# Patient Record
Sex: MALE | Race: WHITE | NOT HISPANIC OR LATINO | URBAN - METROPOLITAN AREA
[De-identification: names, ages, dates, MRNs, and addresses within clinical notes are randomized per-mention and may not be internally consistent; named-entity substitution may affect disease eponyms.]

---

## 2023-04-14 LAB
ALANINE AMINOTRANSFERASE (SGPT) (U/L) IN SER/PLAS: 60 U/L (ref 10–52)
ALBUMIN (G/DL) IN SER/PLAS: 4.3 G/DL (ref 3.4–5)
ALKALINE PHOSPHATASE (U/L) IN SER/PLAS: 56 U/L (ref 33–120)
ANION GAP IN SER/PLAS: 9 MMOL/L (ref 10–20)
APPEARANCE, URINE: CLEAR
ASPARTATE AMINOTRANSFERASE (SGOT) (U/L) IN SER/PLAS: 28 U/L (ref 9–39)
BASOPHILS (10*3/UL) IN BLOOD BY AUTOMATED COUNT: 0.06 X10E9/L (ref 0–0.1)
BASOPHILS/100 LEUKOCYTES IN BLOOD BY AUTOMATED COUNT: 1.1 % (ref 0–2)
BILIRUBIN TOTAL (MG/DL) IN SER/PLAS: 0.5 MG/DL (ref 0–1.2)
BILIRUBIN, URINE: NEGATIVE
BLOOD, URINE: NEGATIVE
CALCIUM (MG/DL) IN SER/PLAS: 9.4 MG/DL (ref 8.6–10.3)
CARBON DIOXIDE, TOTAL (MMOL/L) IN SER/PLAS: 29 MMOL/L (ref 21–32)
CHLORIDE (MMOL/L) IN SER/PLAS: 107 MMOL/L (ref 98–107)
CHOLESTEROL (MG/DL) IN SER/PLAS: 210 MG/DL (ref 0–199)
CHOLESTEROL IN HDL (MG/DL) IN SER/PLAS: 41.2 MG/DL
CHOLESTEROL/HDL RATIO: 5.1
COLOR, URINE: YELLOW
CREATININE (MG/DL) IN SER/PLAS: 1.11 MG/DL (ref 0.5–1.3)
EOSINOPHILS (10*3/UL) IN BLOOD BY AUTOMATED COUNT: 0.17 X10E9/L (ref 0–0.7)
EOSINOPHILS/100 LEUKOCYTES IN BLOOD BY AUTOMATED COUNT: 3 % (ref 0–6)
ERYTHROCYTE DISTRIBUTION WIDTH (RATIO) BY AUTOMATED COUNT: 12.9 % (ref 11.5–14.5)
ERYTHROCYTE MEAN CORPUSCULAR HEMOGLOBIN CONCENTRATION (G/DL) BY AUTOMATED: 30.2 G/DL (ref 32–36)
ERYTHROCYTE MEAN CORPUSCULAR VOLUME (FL) BY AUTOMATED COUNT: 85 FL (ref 80–100)
ERYTHROCYTES (10*6/UL) IN BLOOD BY AUTOMATED COUNT: 5.55 X10E12/L (ref 4.5–5.9)
GFR MALE: 79 ML/MIN/1.73M2
GLUCOSE (MG/DL) IN SER/PLAS: 93 MG/DL (ref 74–99)
GLUCOSE, URINE: NEGATIVE MG/DL
HEMATOCRIT (%) IN BLOOD BY AUTOMATED COUNT: 47 % (ref 41–52)
HEMOGLOBIN (G/DL) IN BLOOD: 14.2 G/DL (ref 13.5–17.5)
IMMATURE GRANULOCYTES/100 LEUKOCYTES IN BLOOD BY AUTOMATED COUNT: 0.5 % (ref 0–0.9)
KETONES, URINE: NEGATIVE MG/DL
LDL: 142 MG/DL (ref 0–99)
LEUKOCYTE ESTERASE, URINE: NEGATIVE
LEUKOCYTES (10*3/UL) IN BLOOD BY AUTOMATED COUNT: 5.7 X10E9/L (ref 4.4–11.3)
LYMPHOCYTES (10*3/UL) IN BLOOD BY AUTOMATED COUNT: 1.35 X10E9/L (ref 1.2–4.8)
LYMPHOCYTES/100 LEUKOCYTES IN BLOOD BY AUTOMATED COUNT: 23.8 % (ref 13–44)
MONOCYTES (10*3/UL) IN BLOOD BY AUTOMATED COUNT: 0.49 X10E9/L (ref 0.1–1)
MONOCYTES/100 LEUKOCYTES IN BLOOD BY AUTOMATED COUNT: 8.6 % (ref 2–10)
NEUTROPHILS (10*3/UL) IN BLOOD BY AUTOMATED COUNT: 3.58 X10E9/L (ref 1.2–7.7)
NEUTROPHILS/100 LEUKOCYTES IN BLOOD BY AUTOMATED COUNT: 63 % (ref 40–80)
NITRITE, URINE: NEGATIVE
NRBC (PER 100 WBCS) BY AUTOMATED COUNT: 0 /100 WBC (ref 0–0)
PH, URINE: 7 (ref 5–8)
PLATELETS (10*3/UL) IN BLOOD AUTOMATED COUNT: 228 X10E9/L (ref 150–450)
POTASSIUM (MMOL/L) IN SER/PLAS: 4.7 MMOL/L (ref 3.5–5.3)
PROSTATE SPECIFIC AG (NG/ML) IN SER/PLAS: 0.4 NG/ML (ref 0–4)
PROTEIN TOTAL: 7 G/DL (ref 6.4–8.2)
PROTEIN, URINE: NEGATIVE MG/DL
SODIUM (MMOL/L) IN SER/PLAS: 140 MMOL/L (ref 136–145)
SPECIFIC GRAVITY, URINE: 1.02 (ref 1–1.03)
TRIGLYCERIDE (MG/DL) IN SER/PLAS: 136 MG/DL (ref 0–149)
UREA NITROGEN (MG/DL) IN SER/PLAS: 18 MG/DL (ref 6–23)
UROBILINOGEN, URINE: <2 MG/DL (ref 0–1.9)
VLDL: 27 MG/DL (ref 0–40)

## 2025-01-15 NOTE — PROGRESS NOTES
History Of Present Illness  HPI   The patient is a 54-year-old male who complains of a 30-year history of right inguinal weakness.  Over the past year he has had pressure sensation and discomfort in the right inguinal area which worsened over the past few weeks.  No prior hernia repair.  No injury.  He has not seen or felt a lump.    Past medical history:  Hypertension on losartan  Cyst removal from arm and leg in 2009 by me  Anal fissure operation in 1999    Past Medical History  He has no past medical history on file.    Surgical History  He has no past surgical history on file.     Allergies  Patient has no known allergies.    Social History  He reports that he has never smoked. He has never used smokeless tobacco. He reports current alcohol use. He reports that he does not use drugs.    Family History  No family history on file.    Review of Systems  Review of Systems:  Constitutional:  no fever, no chills, no significant weight change  Neurological: No history of CVA or seizure disorder  Eyes: No pain, no recent visual change  ENT:  No recent hearing loss  Neck: No pain  Cardiovascular: No chest pain, no history of cardiac disease such as myocardial infarction or arrhythmia or congestive heart failure  Pulmonary: No shortness of breath, no history of pulmonary disease such as pneumonia or COPD  Breast: No history of breast disease or breast mass  Gastrointestinal:   no abdominal pain, no nausea or vomiting, no constipation or diarrhea or blood in the stool.  No history of ulcers.  No liver, gallbladder or pancreas disease.  No intestinal disorder.  Genitourinary: No hematuria or dysuria, no kidney disease  Musculoskeletal:  no arthralgia, no muscle or bone pain  Integumentary:  no rash  Psychiatric:  No anxiety or depression  Endocrine:  no history of diabetes  Hematologic/Lymphatic: No easy bruising or bleeding      Last Recorded Vitals  Blood pressure 137/86, pulse 64, temperature 36.4 °C (97.5 °F),  "temperature source Temporal, resp. rate 17, height 1.803 m (5' 11\"), weight 104 kg (229 lb 9.6 oz), SpO2 96%.    Physical Exam  Constitutional: Well-developed, well-nourished, alert and oriented, no acute distress  Skin: Warm and dry, no lesions, no rashes, no jaundice  HEENT: Normocephalic, atraumatic, EOMI, no scleral icterus, eyes have no redness or swelling or discharge, external inspection of ears and nose is normal, mucous membranes moist  Neck: Soft, nontender, no mass or adenopathy  Cardiac: Regular rate and rhythm, no murmur  Chest: Patent airway, clear to auscultation, normal breath sounds with good chest expansion, no wheezes or rales or rhonchi noted, thorax symmetric  Abdomen: Nondistended, positive bowel sounds, soft, nontender, no mass  Small reducible right inguinal hernia.  No left inguinal or femoral hernias.  Rectal: Not performed  Extremities: No injury, no lower extremity edema or calf tenderness  Lymphatic: No cervical adenopathy  Musculoskeletal: Range of motion intact, no joint swelling, normal strength  Neurological: Alert and oriented x3, intact sensory and motor function, no obvious focal neurologic abnormalities, normal gait  Psychological: Appropriate mood and behavior  Patient declined a chaperone    Relevant Results    Assessment/Plan   Diagnoses and all orders for this visit:  Right inguinal hernia  54-year-old male with a right inguinal hernia which is reducible, but symptomatic.  Recommend right inguinal herniorrhaphy with mesh either open or robotic assisted laparoscopic.  I discussed the procedure and risks with the patient. The risks include bleeding, infection, mesh infection, recurrence, injury to surrounding structures such as nerves/blood vessels/intestine/bladder, chronic postop pain, cardiac/pulmonary complications.   If the mesh becomes infected it could require excision.  I discussed the alternative of hernia repair without mesh and observation.  I told the patient that " his pain may not resolve postop.  The patient is uncertain if he wishes to have an operation.  He will consider his options and notify me of his decision.      Luis Manuel Kaiser MD

## 2025-01-17 ENCOUNTER — APPOINTMENT (OUTPATIENT)
Dept: SURGERY | Facility: CLINIC | Age: 55
End: 2025-01-17

## 2025-01-17 VITALS
RESPIRATION RATE: 17 BRPM | OXYGEN SATURATION: 96 % | DIASTOLIC BLOOD PRESSURE: 86 MMHG | BODY MASS INDEX: 32.14 KG/M2 | SYSTOLIC BLOOD PRESSURE: 137 MMHG | HEIGHT: 71 IN | TEMPERATURE: 97.5 F | HEART RATE: 64 BPM | WEIGHT: 229.6 LBS

## 2025-01-17 DIAGNOSIS — K40.90 RIGHT INGUINAL HERNIA: Primary | ICD-10-CM

## 2025-01-17 PROCEDURE — 99203 OFFICE O/P NEW LOW 30 MIN: CPT | Performed by: SURGERY

## 2025-01-17 PROCEDURE — 1036F TOBACCO NON-USER: CPT | Performed by: SURGERY

## 2025-01-17 PROCEDURE — 3008F BODY MASS INDEX DOCD: CPT | Performed by: SURGERY

## 2025-01-17 RX ORDER — LOSARTAN POTASSIUM 50 MG/1
50 TABLET ORAL DAILY
COMMUNITY

## 2025-01-17 ASSESSMENT — PAIN SCALES - GENERAL: PAINLEVEL_OUTOF10: 0-NO PAIN

## 2025-01-17 NOTE — LETTER
January 17, 2025     Ravinder Pena MD  [address]  [address]  [facility] [address]    Patient: Dada Reyes   YOB: 1970   Date of Visit: 1/17/2025       Dear Dr. Ravinder Pena MD:    Thank you for referring Dada Reyes to me for evaluation. Below are my notes for this consultation.  If you have questions, please do not hesitate to call me. I look forward to following your patient along with you.       Sincerely,     Luis Manuel Kaiser MD      CC: No Recipients  ______________________________________________________________________________________    History Of Present Illness  HPI   The patient is a 54-year-old male who complains of a 30-year history of right inguinal weakness.  Over the past year he has had pressure sensation and discomfort in the right inguinal area which worsened over the past few weeks.  No prior hernia repair.  No injury.  He has not seen or felt a lump.    Past medical history:  Hypertension on losartan  Cyst removal from arm and leg in 2009 by me  Anal fissure operation in 1999    Past Medical History  He has no past medical history on file.    Surgical History  He has no past surgical history on file.     Allergies  Patient has no known allergies.    Social History  He reports that he has never smoked. He has never used smokeless tobacco. He reports current alcohol use. He reports that he does not use drugs.    Family History  No family history on file.    Review of Systems  Review of Systems:  Constitutional:  no fever, no chills, no significant weight change  Neurological: No history of CVA or seizure disorder  Eyes: No pain, no recent visual change  ENT:  No recent hearing loss  Neck: No pain  Cardiovascular: No chest pain, no history of cardiac disease such as myocardial infarction or arrhythmia or congestive heart failure  Pulmonary: No shortness of breath, no history of pulmonary disease such as pneumonia or COPD  Breast: No history of breast disease or breast  "mass  Gastrointestinal:   no abdominal pain, no nausea or vomiting, no constipation or diarrhea or blood in the stool.  No history of ulcers.  No liver, gallbladder or pancreas disease.  No intestinal disorder.  Genitourinary: No hematuria or dysuria, no kidney disease  Musculoskeletal:  no arthralgia, no muscle or bone pain  Integumentary:  no rash  Psychiatric:  No anxiety or depression  Endocrine:  no history of diabetes  Hematologic/Lymphatic: No easy bruising or bleeding      Last Recorded Vitals  Blood pressure 137/86, pulse 64, temperature 36.4 °C (97.5 °F), temperature source Temporal, resp. rate 17, height 1.803 m (5' 11\"), weight 104 kg (229 lb 9.6 oz), SpO2 96%.    Physical Exam  Constitutional: Well-developed, well-nourished, alert and oriented, no acute distress  Skin: Warm and dry, no lesions, no rashes, no jaundice  HEENT: Normocephalic, atraumatic, EOMI, no scleral icterus, eyes have no redness or swelling or discharge, external inspection of ears and nose is normal, mucous membranes moist  Neck: Soft, nontender, no mass or adenopathy  Cardiac: Regular rate and rhythm, no murmur  Chest: Patent airway, clear to auscultation, normal breath sounds with good chest expansion, no wheezes or rales or rhonchi noted, thorax symmetric  Abdomen: Nondistended, positive bowel sounds, soft, nontender, no mass  Small reducible right inguinal hernia.  No left inguinal or femoral hernias.  Rectal: Not performed  Extremities: No injury, no lower extremity edema or calf tenderness  Lymphatic: No cervical adenopathy  Musculoskeletal: Range of motion intact, no joint swelling, normal strength  Neurological: Alert and oriented x3, intact sensory and motor function, no obvious focal neurologic abnormalities, normal gait  Psychological: Appropriate mood and behavior  Patient declined a chaperone    Relevant Results    Assessment/Plan  Diagnoses and all orders for this visit:  Right inguinal hernia  54-year-old male with a " right inguinal hernia which is reducible, but symptomatic.  Recommend right inguinal herniorrhaphy with mesh either open or robotic assisted laparoscopic.  I discussed the procedure and risks with the patient. The risks include bleeding, infection, mesh infection, recurrence, injury to surrounding structures such as nerves/blood vessels/intestine/bladder, chronic postop pain, cardiac/pulmonary complications.   If the mesh becomes infected it could require excision.  I discussed the alternative of hernia repair without mesh and observation.  I told the patient that his pain may not resolve postop.  The patient is uncertain if he wishes to have an operation.  He will consider his options and notify me of his decision.      Luis Manuel Kaiser MD

## 2025-01-23 ENCOUNTER — TELEPHONE (OUTPATIENT)
Dept: SURGERY | Facility: HOSPITAL | Age: 55
End: 2025-01-23
Payer: COMMERCIAL

## 2025-01-23 NOTE — TELEPHONE ENCOUNTER
"Patient called into the office today asking if a radiology order could be placed for more in depth imaging of his hernia.  Patient states, \"that there is more to it than just the hernia he felt. There is pain above the area he felt\".   I informed patient that Dr. Kaiser is in the operating room this morning but as soon as I know anything I'd inform him.  All questions were addressed and answered.    "

## 2025-01-27 ENCOUNTER — TELEPHONE (OUTPATIENT)
Dept: SURGERY | Facility: CLINIC | Age: 55
End: 2025-01-27
Payer: COMMERCIAL

## 2025-01-27 DIAGNOSIS — R10.30 LOWER ABDOMINAL PAIN: Primary | ICD-10-CM

## 2025-01-27 NOTE — TELEPHONE ENCOUNTER
Patient called on 1/27/2025 wanting to schedule his hernia surgery and has some questions for you.  Thank you

## 2025-01-27 NOTE — TELEPHONE ENCOUNTER
Called and notified patient that an order was placed for a CT of pelvis with oral and iv contrast, and blood work as well.  Patient verbalized understanding, and is aware that after the CT scan we will schedule surgery.

## 2025-02-04 LAB
ANION GAP SERPL CALCULATED.4IONS-SCNC: 9 MMOL/L (CALC) (ref 7–17)
BUN SERPL-MCNC: 21 MG/DL (ref 7–25)
BUN/CREAT SERPL: 16 (CALC) (ref 6–22)
CALCIUM SERPL-MCNC: 9.9 MG/DL (ref 8.6–10.3)
CHLORIDE SERPL-SCNC: 105 MMOL/L (ref 98–110)
CO2 SERPL-SCNC: 25 MMOL/L (ref 20–32)
CREAT SERPL-MCNC: 1.35 MG/DL (ref 0.7–1.3)
EGFRCR SERPLBLD CKD-EPI 2021: 62 ML/MIN/1.73M2
ERYTHROCYTE [DISTWIDTH] IN BLOOD BY AUTOMATED COUNT: 13 % (ref 11–15)
GLUCOSE SERPL-MCNC: 109 MG/DL (ref 65–99)
HCT VFR BLD AUTO: 49.5 % (ref 38.5–50)
HGB BLD-MCNC: 15.1 G/DL (ref 13.2–17.1)
MCH RBC QN AUTO: 25.2 PG (ref 27–33)
MCHC RBC AUTO-ENTMCNC: 30.5 G/DL (ref 32–36)
MCV RBC AUTO: 82.6 FL (ref 80–100)
PLATELET # BLD AUTO: 246 THOUSAND/UL (ref 140–400)
PMV BLD REES-ECKER: 11.6 FL (ref 7.5–12.5)
POTASSIUM SERPL-SCNC: 4.7 MMOL/L (ref 3.5–5.3)
RBC # BLD AUTO: 5.99 MILLION/UL (ref 4.2–5.8)
SODIUM SERPL-SCNC: 139 MMOL/L (ref 135–146)
WBC # BLD AUTO: 7.9 THOUSAND/UL (ref 3.8–10.8)

## 2025-02-05 ENCOUNTER — APPOINTMENT (OUTPATIENT)
Dept: RADIOLOGY | Facility: CLINIC | Age: 55
End: 2025-02-05
Payer: COMMERCIAL

## 2025-02-05 ENCOUNTER — HOSPITAL ENCOUNTER (OUTPATIENT)
Dept: RADIOLOGY | Facility: CLINIC | Age: 55
Discharge: HOME | End: 2025-02-05
Payer: COMMERCIAL

## 2025-02-05 DIAGNOSIS — R10.30 LOWER ABDOMINAL PAIN: ICD-10-CM

## 2025-02-05 PROCEDURE — A9698 NON-RAD CONTRAST MATERIALNOC: HCPCS | Performed by: SURGERY

## 2025-02-05 PROCEDURE — 2550000001 HC RX 255 CONTRASTS: Performed by: SURGERY

## 2025-02-05 PROCEDURE — 72193 CT PELVIS W/DYE: CPT

## 2025-02-05 RX ADMIN — IOHEXOL 500 ML: 12 SOLUTION ORAL at 08:46

## 2025-02-05 RX ADMIN — IOHEXOL 75 ML: 350 INJECTION, SOLUTION INTRAVENOUS at 08:54

## 2025-02-13 NOTE — PROGRESS NOTES
"History Of Present Illness  HPI   January 17, 2025  The patient is a 54-year-old male who complains of a 30-year history of right inguinal weakness.  Over the past year he has had pressure sensation and discomfort in the right inguinal area which worsened over the past few weeks.  No prior hernia repair.  No injury.  He has not seen or felt a lump.     February 14, 2025  The patient was found to have a reducible right inguinal hernia at his last appointment for which I recommended repair.  The patient was undecided whether he wished to have the hernia repaired.  He later called and was concerned about right lower quadrant abdominal pain and wished to have additional testing for the pain prior to considering hernia repair.  He complains of right inguinal pressure discomfort.  No other complaints.    Past medical history:  Hypertension on losartan  Cyst removal from arm and leg in 2009 by me  Anal fissure operation in 1999  Colonoscopy 4 years ago at which time he was seen to have some polyps.  He plans to repeat a colonoscopy this year.  He follows with [facility] urology.    Past Medical History  He has no past medical history on file.    Surgical History  He has no past surgical history on file.     Allergies  Patient has no known allergies.    Social History  He reports that he has never smoked. He has never used smokeless tobacco. He reports current alcohol use. He reports that he does not use drugs.    Family History  No family history on file.    Last Recorded Vitals  Blood pressure 135/84, pulse 87, temperature 36.3 °C (97.4 °F), temperature source Temporal, resp. rate 17, height 1.803 m (5' 11\"), weight 101 kg (222 lb 3.2 oz), SpO2 98%.    Physical Exam   Constitutional: Well-developed, well-nourished, alert and oriented, no acute distress  Skin: Warm and dry, no lesions, no rashes, no jaundice  HEENT: Normocephalic, atraumatic, EOMI, no scleral icterus, eyes have no redness or swelling or discharge, external " inspection of ears and nose is normal, mucous membranes moist  Neck: Soft, nontender, no mass or adenopathy  Cardiac: Regular rate and rhythm, no murmur  Chest: Patent airway, clear to auscultation, normal breath sounds with good chest expansion, no wheezes or rales or rhonchi noted, thorax symmetric  Abdomen: Nondistended, positive bowel sounds, soft, nontender, no mass.  Umbilical hernia with 2 cm diameter hernia sac.  Reducible.  Right inguinal hernia, reducible.  No left inguinal or femoral hernias.  Rectal: Not performed  Extremities: No injury, no lower extremity edema or calf tenderness  Lymphatic: No cervical adenopathy  Musculoskeletal: Range of motion intact, no joint swelling, normal strength  Neurological: Alert and oriented x3, intact sensory and motor function, no obvious focal neurologic abnormalities, normal gait  Psychological: Appropriate mood and behavior  Patient declined a chaperone    Relevant Results  Labs from February 3, 2025: WBC 7.9, hemoglobin 15.1, platelet 246  Creatinine 1.35, glucose 109, BMP otherwise normal    I reviewed the CT pelvis report and images from February 5, 2025:  IMPRESSION:  Small to moderate right and small left fat containing inguinal  hernias and small fat containing umbilical hernia.    Tortuous and redundant sigmoid colon extending superiorly at least  into the mid abdomen with distended gas-filled proximal portion as  described of uncertain significance. Relative collapsed appearance of  the colon distally which otherwise limits evaluation.    Mildly enlarged prostate gland. Correlate with serum PSA.    Probable small partially imaged hydroceles. Correlation with  dedicated ultrasound may be considered for further assessment.    The patient requested that I reviewed the CT images with him which I did.    Assessment/Plan   Diagnoses and all orders for this visit:  Right inguinal hernia    54-year-old male with a right inguinal hernia which is reducible, but  symptomatic.  Recommend right inguinal herniorrhaphy with mesh either open or robotic assisted laparoscopic.  The patient also has a reducible umbilical hernia which is asymptomatic.  I again discussed the procedure and risks with the patient. The risks include bleeding, infection, mesh infection, recurrence, injury to surrounding structures such as nerves/blood vessels/intestine/bladder, chronic postop pain, cardiac/pulmonary complications.   If the mesh becomes infected it could require excision.  I discussed the alternative of hernia repair without mesh and observation.  I have told the patient that his pain may not resolve postop.  The patient wishes to have a robotic assisted laparoscopic right inguinal herniorrhaphy with mesh with possible bilateral repair and possible open operation.  He also wishes to have open repair of the umbilical hernia possibly with mesh.  Electronic consent obtained.  I told the patient to follow-up with his urologist regarding the  findings on CT scan including the kidney cyst, enlarged prostate and hydrocele.      Luis Manuel Kaiser MD

## 2025-02-14 ENCOUNTER — OFFICE VISIT (OUTPATIENT)
Dept: SURGERY | Facility: CLINIC | Age: 55
End: 2025-02-14
Payer: COMMERCIAL

## 2025-02-14 ENCOUNTER — TELEPHONE (OUTPATIENT)
Dept: SURGERY | Facility: HOSPITAL | Age: 55
End: 2025-02-14

## 2025-02-14 VITALS
RESPIRATION RATE: 17 BRPM | BODY MASS INDEX: 31.11 KG/M2 | DIASTOLIC BLOOD PRESSURE: 84 MMHG | WEIGHT: 222.2 LBS | SYSTOLIC BLOOD PRESSURE: 135 MMHG | TEMPERATURE: 97.4 F | HEIGHT: 71 IN | HEART RATE: 87 BPM | OXYGEN SATURATION: 98 %

## 2025-02-14 DIAGNOSIS — K42.9 UMBILICAL HERNIA WITHOUT OBSTRUCTION AND WITHOUT GANGRENE: ICD-10-CM

## 2025-02-14 DIAGNOSIS — K40.90 RIGHT INGUINAL HERNIA: Primary | ICD-10-CM

## 2025-02-14 PROCEDURE — 1036F TOBACCO NON-USER: CPT | Performed by: SURGERY

## 2025-02-14 PROCEDURE — 99214 OFFICE O/P EST MOD 30 MIN: CPT | Performed by: SURGERY

## 2025-02-14 PROCEDURE — 3008F BODY MASS INDEX DOCD: CPT | Performed by: SURGERY

## 2025-02-14 RX ORDER — CEFAZOLIN SODIUM 2 G/100ML
2 INJECTION, SOLUTION INTRAVENOUS ONCE
OUTPATIENT
Start: 2025-02-14 | End: 2025-02-14

## 2025-02-14 ASSESSMENT — PAIN SCALES - GENERAL: PAINLEVEL_OUTOF10: 0-NO PAIN

## 2025-02-14 NOTE — LETTER
February 14, 2025     Ravinder Pena MD  [address]  [address]  [facility] [address]    Patient: Dada Reyes   YOB: 1970   Date of Visit: 2/14/2025       Dear Dr. Ravinder Pena MD:    Thank you for referring Dada Reyes to me for evaluation. Below are my notes for this consultation.  If you have questions, please do not hesitate to call me. I look forward to following your patient along with you.       Sincerely,     Luis Manuel Kaiser MD      CC: No Recipients  ______________________________________________________________________________________    History Of Present Illness  HPI   January 17, 2025  The patient is a 54-year-old male who complains of a 30-year history of right inguinal weakness.  Over the past year he has had pressure sensation and discomfort in the right inguinal area which worsened over the past few weeks.  No prior hernia repair.  No injury.  He has not seen or felt a lump.     February 14, 2025  The patient was found to have a reducible right inguinal hernia at his last appointment for which I recommended repair.  The patient was undecided whether he wished to have the hernia repaired.  He later called and was concerned about right lower quadrant abdominal pain and wished to have additional testing for the pain prior to considering hernia repair.  He complains of right inguinal pressure discomfort.  No other complaints.    Past medical history:  Hypertension on losartan  Cyst removal from arm and leg in 2009 by me  Anal fissure operation in 1999  Colonoscopy 4 years ago at which time he was seen to have some polyps.  He plans to repeat a colonoscopy this year.  He follows with [facility] urology.    Past Medical History  He has no past medical history on file.    Surgical History  He has no past surgical history on file.     Allergies  Patient has no known allergies.    Social History  He reports that he has never smoked. He has never used smokeless tobacco. He reports current  "alcohol use. He reports that he does not use drugs.    Family History  No family history on file.    Last Recorded Vitals  Blood pressure 135/84, pulse 87, temperature 36.3 °C (97.4 °F), temperature source Temporal, resp. rate 17, height 1.803 m (5' 11\"), weight 101 kg (222 lb 3.2 oz), SpO2 98%.    Physical Exam   Constitutional: Well-developed, well-nourished, alert and oriented, no acute distress  Skin: Warm and dry, no lesions, no rashes, no jaundice  HEENT: Normocephalic, atraumatic, EOMI, no scleral icterus, eyes have no redness or swelling or discharge, external inspection of ears and nose is normal, mucous membranes moist  Neck: Soft, nontender, no mass or adenopathy  Cardiac: Regular rate and rhythm, no murmur  Chest: Patent airway, clear to auscultation, normal breath sounds with good chest expansion, no wheezes or rales or rhonchi noted, thorax symmetric  Abdomen: Nondistended, positive bowel sounds, soft, nontender, no mass.  Umbilical hernia with 2 cm diameter hernia sac.  Reducible.  Right inguinal hernia, reducible.  No left inguinal or femoral hernias.  Rectal: Not performed  Extremities: No injury, no lower extremity edema or calf tenderness  Lymphatic: No cervical adenopathy  Musculoskeletal: Range of motion intact, no joint swelling, normal strength  Neurological: Alert and oriented x3, intact sensory and motor function, no obvious focal neurologic abnormalities, normal gait  Psychological: Appropriate mood and behavior  Patient declined a chaperone    Relevant Results  Labs from February 3, 2025: WBC 7.9, hemoglobin 15.1, platelet 246  Creatinine 1.35, glucose 109, BMP otherwise normal    I reviewed the CT pelvis report and images from February 5, 2025:  IMPRESSION:  Small to moderate right and small left fat containing inguinal  hernias and small fat containing umbilical hernia.    Tortuous and redundant sigmoid colon extending superiorly at least  into the mid abdomen with distended gas-filled " proximal portion as  described of uncertain significance. Relative collapsed appearance of  the colon distally which otherwise limits evaluation.    Mildly enlarged prostate gland. Correlate with serum PSA.    Probable small partially imaged hydroceles. Correlation with  dedicated ultrasound may be considered for further assessment.    The patient requested that I reviewed the CT images with him which I did.    Assessment/Plan  Diagnoses and all orders for this visit:  Right inguinal hernia    54-year-old male with a right inguinal hernia which is reducible, but symptomatic.  Recommend right inguinal herniorrhaphy with mesh either open or robotic assisted laparoscopic.  The patient also has a reducible umbilical hernia which is asymptomatic.  I again discussed the procedure and risks with the patient. The risks include bleeding, infection, mesh infection, recurrence, injury to surrounding structures such as nerves/blood vessels/intestine/bladder, chronic postop pain, cardiac/pulmonary complications.   If the mesh becomes infected it could require excision.  I discussed the alternative of hernia repair without mesh and observation.  I have told the patient that his pain may not resolve postop.  The patient wishes to have a robotic assisted laparoscopic right inguinal herniorrhaphy with mesh with possible bilateral repair and possible open operation.  He also wishes to have open repair of the umbilical hernia possibly with mesh.  Electronic consent obtained.  I told the patient to follow-up with his urologist regarding the  findings on CT scan including the kidney cyst, enlarged prostate and hydrocele.      Luis Manuel Kaiser MD

## 2025-02-14 NOTE — TELEPHONE ENCOUNTER
Called and spoke to patient regarding how long he would be off of work for his hernia repair surgery.  After discussing with Dr. Kaiser he was fine with patient returning to work anytime from a week to two weeks.  Relayed this information to patient, and I encouraged him to discuss with him employer about any FMLA forms.  Once he obtains forms I encouraged him to attach to a [employer] message to the office, or he could call me.  Direct phone number was provided.

## 2025-03-06 NOTE — H&P (VIEW-ONLY)
"History Of Present Illness  HPI    January 17, 2025  The patient is a 54-year-old male who complains of a 30-year history of right inguinal weakness.  Over the past year he has had pressure sensation and discomfort in the right inguinal area which worsened over the past few weeks.  No prior hernia repair.  No injury.  He has not seen or felt a lump.     February 14, 2025  The patient was found to have a reducible right inguinal hernia at his last appointment for which I recommended repair.  The patient was undecided whether he wished to have the hernia repaired.  He later called and was concerned about right lower quadrant abdominal pain and wished to have additional testing for the pain prior to considering hernia repair.  He complains of right inguinal pressure discomfort.  No other complaints.    March 7, 2025  The patient follows up for his hernias.  No change in the hernias and no new medical problems since his last appointment.  I have told the patient to follow-up with his urologist regarding the  findings on CT scan including the kidney cyst, enlarged prostate and hydrocele.     Past medical history:  Hypertension on losartan  Cyst removal from arm and leg in 2009 by me  Anal fissure operation in 1999  Colonoscopy 4 years ago at which time he was seen to have some polyps.  He plans to repeat a colonoscopy this year.  He follows with [facility] urology.     Past Medical History  He has no past medical history on file.    Surgical History  He has no past surgical history on file.     Allergies  Patient has no known allergies.    Social History  He reports that he has never smoked. He has never used smokeless tobacco. He reports current alcohol use. He reports that he does not use drugs.    Family History  No family history on file.      Last Recorded Vitals  Blood pressure 135/72, pulse 67, temperature 36.3 °C (97.3 °F), temperature source Temporal, resp. rate 17, height 1.803 m (5' 11\"), weight 103 kg (227 " lb 12.8 oz), SpO2 96%.    Physical Exam   Constitutional: Well-developed, well-nourished, alert and oriented, no acute distress  Skin: Warm and dry, no lesions, no rashes, no jaundice  HEENT: Normocephalic, atraumatic, EOMI, no scleral icterus, eyes have no redness or swelling or discharge, external inspection of ears and nose is normal, mucous membranes moist  Neck: Soft, nontender, no mass or adenopathy  Cardiac: Regular rate and rhythm, no murmur  Chest: Patent airway, clear to auscultation, normal breath sounds with good chest expansion, no wheezes or rales or rhonchi noted, thorax symmetric  Abdomen: Nondistended, positive bowel sounds, soft, nontender, no mass.  Umbilical hernia with 2 cm diameter hernia sac.  Reducible.  Right inguinal hernia, reducible.  No left inguinal or femoral hernias.  Rectal: Not performed  Extremities: No injury, no lower extremity edema or calf tenderness  Lymphatic: No cervical adenopathy  Musculoskeletal: Range of motion intact, no joint swelling, normal strength  Neurological: Alert and oriented x3, intact sensory and motor function, no obvious focal neurologic abnormalities, normal gait  Psychological: Appropriate mood and behavior  Patient declined a chaperone       Relevant Results    Assessment/Plan   Diagnoses and all orders for this visit:  Right inguinal hernia  Umbilical hernia without obstruction and without gangrene    54-year-old male with a right inguinal hernia which is reducible, but symptomatic.  Recommend right inguinal herniorrhaphy with mesh either open or robotic assisted laparoscopic.  The patient also has a reducible umbilical hernia which is asymptomatic.  I again discussed the procedure and risks with the patient. The risks include bleeding, infection, mesh infection, recurrence, injury to surrounding structures such as nerves/blood vessels/intestine/bladder, chronic postop pain, cardiac/pulmonary complications.   If the mesh becomes infected it could  require excision.  I discussed the alternative of hernia repair without mesh and observation.  I have told the patient that his pain may not resolve postop.  The patient wishes to have a robotic assisted laparoscopic right inguinal herniorrhaphy with mesh with possible bilateral repair and possible open operation.  He also wishes to have open repair of the umbilical hernia possibly with mesh.  I answered all of the patient's questions regarding the operation.  Electronic consent has been obtained.         Luis Manuel Kaiser MD

## 2025-03-06 NOTE — PROGRESS NOTES
"History Of Present Illness  HPI    January 17, 2025  The patient is a 54-year-old male who complains of a 30-year history of right inguinal weakness.  Over the past year he has had pressure sensation and discomfort in the right inguinal area which worsened over the past few weeks.  No prior hernia repair.  No injury.  He has not seen or felt a lump.     February 14, 2025  The patient was found to have a reducible right inguinal hernia at his last appointment for which I recommended repair.  The patient was undecided whether he wished to have the hernia repaired.  He later called and was concerned about right lower quadrant abdominal pain and wished to have additional testing for the pain prior to considering hernia repair.  He complains of right inguinal pressure discomfort.  No other complaints.    March 7, 2025  The patient follows up for his hernias.  No change in the hernias and no new medical problems since his last appointment.  I have told the patient to follow-up with his urologist regarding the  findings on CT scan including the kidney cyst, enlarged prostate and hydrocele.     Past medical history:  Hypertension on losartan  Cyst removal from arm and leg in 2009 by me  Anal fissure operation in 1999  Colonoscopy 4 years ago at which time he was seen to have some polyps.  He plans to repeat a colonoscopy this year.  He follows with [facility] urology.     Past Medical History  He has no past medical history on file.    Surgical History  He has no past surgical history on file.     Allergies  Patient has no known allergies.    Social History  He reports that he has never smoked. He has never used smokeless tobacco. He reports current alcohol use. He reports that he does not use drugs.    Family History  No family history on file.      Last Recorded Vitals  Blood pressure 135/72, pulse 67, temperature 36.3 °C (97.3 °F), temperature source Temporal, resp. rate 17, height 1.803 m (5' 11\"), weight 103 kg (227 " lb 12.8 oz), SpO2 96%.    Physical Exam   Constitutional: Well-developed, well-nourished, alert and oriented, no acute distress  Skin: Warm and dry, no lesions, no rashes, no jaundice  HEENT: Normocephalic, atraumatic, EOMI, no scleral icterus, eyes have no redness or swelling or discharge, external inspection of ears and nose is normal, mucous membranes moist  Neck: Soft, nontender, no mass or adenopathy  Cardiac: Regular rate and rhythm, no murmur  Chest: Patent airway, clear to auscultation, normal breath sounds with good chest expansion, no wheezes or rales or rhonchi noted, thorax symmetric  Abdomen: Nondistended, positive bowel sounds, soft, nontender, no mass.  Umbilical hernia with 2 cm diameter hernia sac.  Reducible.  Right inguinal hernia, reducible.  No left inguinal or femoral hernias.  Rectal: Not performed  Extremities: No injury, no lower extremity edema or calf tenderness  Lymphatic: No cervical adenopathy  Musculoskeletal: Range of motion intact, no joint swelling, normal strength  Neurological: Alert and oriented x3, intact sensory and motor function, no obvious focal neurologic abnormalities, normal gait  Psychological: Appropriate mood and behavior  Patient declined a chaperone       Relevant Results    Assessment/Plan   Diagnoses and all orders for this visit:  Right inguinal hernia  Umbilical hernia without obstruction and without gangrene    54-year-old male with a right inguinal hernia which is reducible, but symptomatic.  Recommend right inguinal herniorrhaphy with mesh either open or robotic assisted laparoscopic.  The patient also has a reducible umbilical hernia which is asymptomatic.  I again discussed the procedure and risks with the patient. The risks include bleeding, infection, mesh infection, recurrence, injury to surrounding structures such as nerves/blood vessels/intestine/bladder, chronic postop pain, cardiac/pulmonary complications.   If the mesh becomes infected it could  require excision.  I discussed the alternative of hernia repair without mesh and observation.  I have told the patient that his pain may not resolve postop.  The patient wishes to have a robotic assisted laparoscopic right inguinal herniorrhaphy with mesh with possible bilateral repair and possible open operation.  He also wishes to have open repair of the umbilical hernia possibly with mesh.  I answered all of the patient's questions regarding the operation.  Electronic consent has been obtained.         Luis Manuel Kaiser MD

## 2025-03-07 ENCOUNTER — APPOINTMENT (OUTPATIENT)
Dept: SURGERY | Facility: CLINIC | Age: 55
End: 2025-03-07
Payer: COMMERCIAL

## 2025-03-07 VITALS
RESPIRATION RATE: 17 BRPM | DIASTOLIC BLOOD PRESSURE: 72 MMHG | WEIGHT: 227.8 LBS | HEIGHT: 71 IN | BODY MASS INDEX: 31.89 KG/M2 | OXYGEN SATURATION: 96 % | TEMPERATURE: 97.3 F | HEART RATE: 67 BPM | SYSTOLIC BLOOD PRESSURE: 135 MMHG

## 2025-03-07 DIAGNOSIS — K40.90 RIGHT INGUINAL HERNIA: Primary | ICD-10-CM

## 2025-03-07 DIAGNOSIS — K42.9 UMBILICAL HERNIA WITHOUT OBSTRUCTION AND WITHOUT GANGRENE: ICD-10-CM

## 2025-03-07 PROCEDURE — 3008F BODY MASS INDEX DOCD: CPT | Performed by: SURGERY

## 2025-03-07 PROCEDURE — 99212 OFFICE O/P EST SF 10 MIN: CPT | Performed by: SURGERY

## 2025-03-07 PROCEDURE — 1036F TOBACCO NON-USER: CPT | Performed by: SURGERY

## 2025-03-07 ASSESSMENT — PAIN SCALES - GENERAL: PAINLEVEL_OUTOF10: 0-NO PAIN

## 2025-03-19 NOTE — PREPROCEDURE INSTRUCTIONS
Current Medications   Medication Instructions    losartan (Cozaar) 50 mg tablet Do not take morning of surgery          NPO Instructions:    Do not eat any food after midnight the night before your surgery.  You may have 13 ounces of clear liquids until TWO hours before surgery. This includes water, black tea/coffee, (no milk or cream) apple juice and electrolyte drinks (Gatorade).    Additional Instructions:     Day of Surgery: Arrive in registration at 6:00 AM for 7:30 AM surgery    Enter through the main entrance of [facility], located at [address]. Proceed to registration, located on the right hand side of the staircase. You will need your ID and insurance card for registration. Please ensure you have a responsible adult to drive you home.     Take a shower before your procedure. After your shower avoid lotions, powders, deodorants or anything applied to the skin. If you wear contacts or glasses, wear the glasses. If you do not have glasses, please bring a case for your contacts. You may wear hearing aids and dentures, bring a case for them or we will provide one. Make sure you wear something loose and comfortable. Keep in mind your surgical procedure and wear something that will accommodate incisions or bandages. Please remove all jewelry and piercing's.     For further questions Omar JAVIER can be contacted at [phone] between 7AM-3PM.

## 2025-03-20 ENCOUNTER — ANESTHESIA EVENT (OUTPATIENT)
Dept: OPERATING ROOM | Facility: HOSPITAL | Age: 55
End: 2025-03-20
Payer: COMMERCIAL

## 2025-03-20 ENCOUNTER — ANESTHESIA (OUTPATIENT)
Dept: OPERATING ROOM | Facility: HOSPITAL | Age: 55
End: 2025-03-20
Payer: COMMERCIAL

## 2025-03-20 ENCOUNTER — PHARMACY VISIT (OUTPATIENT)
Dept: PHARMACY | Facility: CLINIC | Age: 55
End: 2025-03-20
Payer: COMMERCIAL

## 2025-03-20 ENCOUNTER — HOSPITAL ENCOUNTER (OUTPATIENT)
Facility: HOSPITAL | Age: 55
Setting detail: OUTPATIENT SURGERY
Discharge: HOME | End: 2025-03-20
Attending: SURGERY | Admitting: SURGERY
Payer: COMMERCIAL

## 2025-03-20 VITALS
HEART RATE: 83 BPM | HEIGHT: 71 IN | RESPIRATION RATE: 16 BRPM | OXYGEN SATURATION: 98 % | DIASTOLIC BLOOD PRESSURE: 87 MMHG | WEIGHT: 227.07 LBS | SYSTOLIC BLOOD PRESSURE: 153 MMHG | TEMPERATURE: 97.2 F | BODY MASS INDEX: 31.79 KG/M2

## 2025-03-20 DIAGNOSIS — K42.9 UMBILICAL HERNIA WITHOUT OBSTRUCTION AND WITHOUT GANGRENE: ICD-10-CM

## 2025-03-20 DIAGNOSIS — K40.90 RIGHT INGUINAL HERNIA: Primary | ICD-10-CM

## 2025-03-20 PROCEDURE — 3600000018 HC OR TIME - INITIAL BASE CHARGE - PROCEDURE LEVEL SIX: Performed by: SURGERY

## 2025-03-20 PROCEDURE — 3600000017 HC OR TIME - EACH INCREMENTAL 1 MINUTE - PROCEDURE LEVEL SIX: Performed by: SURGERY

## 2025-03-20 PROCEDURE — 88302 TISSUE EXAM BY PATHOLOGIST: CPT | Mod: TC,PARLAB,WESLAB | Performed by: SURGERY

## 2025-03-20 PROCEDURE — 2500000004 HC RX 250 GENERAL PHARMACY W/ HCPCS (ALT 636 FOR OP/ED): Performed by: ANESTHESIOLOGY

## 2025-03-20 PROCEDURE — 2500000004 HC RX 250 GENERAL PHARMACY W/ HCPCS (ALT 636 FOR OP/ED): Performed by: SURGERY

## 2025-03-20 PROCEDURE — 2500000005 HC RX 250 GENERAL PHARMACY W/O HCPCS: Performed by: SURGERY

## 2025-03-20 PROCEDURE — 7100000010 HC PHASE TWO TIME - EACH INCREMENTAL 1 MINUTE: Performed by: SURGERY

## 2025-03-20 PROCEDURE — 7100000002 HC RECOVERY ROOM TIME - EACH INCREMENTAL 1 MINUTE: Performed by: SURGERY

## 2025-03-20 PROCEDURE — 88302 TISSUE EXAM BY PATHOLOGIST: CPT | Performed by: PATHOLOGY

## 2025-03-20 PROCEDURE — RXMED WILLOW AMBULATORY MEDICATION CHARGE

## 2025-03-20 PROCEDURE — C1781 MESH (IMPLANTABLE): HCPCS | Performed by: SURGERY

## 2025-03-20 PROCEDURE — 7100000009 HC PHASE TWO TIME - INITIAL BASE CHARGE: Performed by: SURGERY

## 2025-03-20 PROCEDURE — 49650 LAP ING HERNIA REPAIR INIT: CPT | Performed by: SURGERY

## 2025-03-20 PROCEDURE — 2780000003 HC OR 278 NO HCPCS: Performed by: SURGERY

## 2025-03-20 PROCEDURE — A49650 PR LAP,INGUINAL HERNIA REPR,INITIAL: Performed by: ANESTHESIOLOGY

## 2025-03-20 PROCEDURE — 2720000007 HC OR 272 NO HCPCS: Performed by: SURGERY

## 2025-03-20 PROCEDURE — 7100000001 HC RECOVERY ROOM TIME - INITIAL BASE CHARGE: Performed by: SURGERY

## 2025-03-20 PROCEDURE — 2500000002 HC RX 250 W HCPCS SELF ADMINISTERED DRUGS (ALT 637 FOR MEDICARE OP, ALT 636 FOR OP/ED): Performed by: ANESTHESIOLOGY

## 2025-03-20 PROCEDURE — 3700000001 HC GENERAL ANESTHESIA TIME - INITIAL BASE CHARGE: Performed by: SURGERY

## 2025-03-20 PROCEDURE — 3700000002 HC GENERAL ANESTHESIA TIME - EACH INCREMENTAL 1 MINUTE: Performed by: SURGERY

## 2025-03-20 PROCEDURE — 49591 RPR AA HRN 1ST < 3 CM RDC: CPT | Performed by: SURGERY

## 2025-03-20 DEVICE — LAPAROSCOPIC SELF-FIXATING MESH POLYESTER WITH POLYLACTIC ACID GRIPS AND COLLAGEN FILM
Type: IMPLANTABLE DEVICE | Site: INGUINAL | Status: FUNCTIONAL
Brand: PROGRIP

## 2025-03-20 RX ORDER — DEXAMETHASONE SODIUM PHOSPHATE 10 MG/ML
6 INJECTION INTRAMUSCULAR; INTRAVENOUS ONCE
Status: DISCONTINUED | OUTPATIENT
Start: 2025-03-20 | End: 2025-03-20 | Stop reason: HOSPADM

## 2025-03-20 RX ORDER — LIDOCAINE HCL/PF 100 MG/5ML
SYRINGE (ML) INTRAVENOUS AS NEEDED
Status: DISCONTINUED | OUTPATIENT
Start: 2025-03-20 | End: 2025-03-20

## 2025-03-20 RX ORDER — SODIUM CHLORIDE, SODIUM LACTATE, POTASSIUM CHLORIDE, CALCIUM CHLORIDE 600; 310; 30; 20 MG/100ML; MG/100ML; MG/100ML; MG/100ML
INJECTION, SOLUTION INTRAVENOUS CONTINUOUS PRN
Status: DISCONTINUED | OUTPATIENT
Start: 2025-03-20 | End: 2025-03-20

## 2025-03-20 RX ORDER — GABAPENTIN 300 MG/1
300 CAPSULE ORAL 3 TIMES DAILY PRN
Qty: 15 CAPSULE | Refills: 0 | Status: SHIPPED | OUTPATIENT
Start: 2025-03-20

## 2025-03-20 RX ORDER — PROPOFOL 10 MG/ML
INJECTION, EMULSION INTRAVENOUS AS NEEDED
Status: DISCONTINUED | OUTPATIENT
Start: 2025-03-20 | End: 2025-03-20

## 2025-03-20 RX ORDER — KETOROLAC TROMETHAMINE 30 MG/ML
INJECTION, SOLUTION INTRAMUSCULAR; INTRAVENOUS AS NEEDED
Status: DISCONTINUED | OUTPATIENT
Start: 2025-03-20 | End: 2025-03-20

## 2025-03-20 RX ORDER — MEPERIDINE HYDROCHLORIDE 50 MG/ML
12.5 INJECTION INTRAMUSCULAR; INTRAVENOUS; SUBCUTANEOUS EVERY 10 MIN PRN
Status: DISCONTINUED | OUTPATIENT
Start: 2025-03-20 | End: 2025-03-20 | Stop reason: HOSPADM

## 2025-03-20 RX ORDER — ACETAMINOPHEN 325 MG/1
650 TABLET ORAL EVERY 4 HOURS PRN
Status: DISCONTINUED | OUTPATIENT
Start: 2025-03-20 | End: 2025-03-20 | Stop reason: HOSPADM

## 2025-03-20 RX ORDER — APREPITANT 40 MG/1
40 CAPSULE ORAL ONCE
Status: COMPLETED | OUTPATIENT
Start: 2025-03-20 | End: 2025-03-20

## 2025-03-20 RX ORDER — BUPIVACAINE HYDROCHLORIDE 5 MG/ML
INJECTION, SOLUTION PERINEURAL AS NEEDED
Status: DISCONTINUED | OUTPATIENT
Start: 2025-03-20 | End: 2025-03-20 | Stop reason: HOSPADM

## 2025-03-20 RX ORDER — WATER 1 ML/ML
INJECTION IRRIGATION AS NEEDED
Status: DISCONTINUED | OUTPATIENT
Start: 2025-03-20 | End: 2025-03-20 | Stop reason: HOSPADM

## 2025-03-20 RX ORDER — ROCURONIUM BROMIDE 10 MG/ML
INJECTION, SOLUTION INTRAVENOUS AS NEEDED
Status: DISCONTINUED | OUTPATIENT
Start: 2025-03-20 | End: 2025-03-20

## 2025-03-20 RX ORDER — ONDANSETRON HYDROCHLORIDE 2 MG/ML
4 INJECTION, SOLUTION INTRAVENOUS ONCE AS NEEDED
Status: COMPLETED | OUTPATIENT
Start: 2025-03-20 | End: 2025-03-20

## 2025-03-20 RX ORDER — CEFAZOLIN SODIUM 2 G/100ML
2 INJECTION, SOLUTION INTRAVENOUS ONCE
Status: COMPLETED | OUTPATIENT
Start: 2025-03-20 | End: 2025-03-20

## 2025-03-20 RX ORDER — FENTANYL CITRATE 50 UG/ML
INJECTION, SOLUTION INTRAMUSCULAR; INTRAVENOUS AS NEEDED
Status: DISCONTINUED | OUTPATIENT
Start: 2025-03-20 | End: 2025-03-20

## 2025-03-20 RX ORDER — ONDANSETRON HYDROCHLORIDE 2 MG/ML
INJECTION, SOLUTION INTRAVENOUS AS NEEDED
Status: DISCONTINUED | OUTPATIENT
Start: 2025-03-20 | End: 2025-03-20

## 2025-03-20 RX ORDER — MIDAZOLAM HYDROCHLORIDE 1 MG/ML
INJECTION, SOLUTION INTRAMUSCULAR; INTRAVENOUS AS NEEDED
Status: DISCONTINUED | OUTPATIENT
Start: 2025-03-20 | End: 2025-03-20

## 2025-03-20 RX ORDER — OXYCODONE HYDROCHLORIDE 5 MG/1
5 TABLET ORAL EVERY 6 HOURS PRN
Qty: 5 TABLET | Refills: 0 | Status: SHIPPED | OUTPATIENT
Start: 2025-03-20

## 2025-03-20 RX ORDER — ALBUTEROL SULFATE 0.83 MG/ML
2.5 SOLUTION RESPIRATORY (INHALATION) ONCE AS NEEDED
Status: DISCONTINUED | OUTPATIENT
Start: 2025-03-20 | End: 2025-03-20 | Stop reason: HOSPADM

## 2025-03-20 RX ADMIN — LIDOCAINE HYDROCHLORIDE 60 MG: 20 INJECTION INTRAVENOUS at 07:32

## 2025-03-20 RX ADMIN — DEXAMETHASONE SODIUM PHOSPHATE 4 MG: 4 INJECTION, SOLUTION INTRAMUSCULAR; INTRAVENOUS at 07:40

## 2025-03-20 RX ADMIN — FENTANYL CITRATE 25 MCG: 50 INJECTION, SOLUTION INTRAMUSCULAR; INTRAVENOUS at 08:35

## 2025-03-20 RX ADMIN — PROPOFOL 200 MG: 10 INJECTION, EMULSION INTRAVENOUS at 07:32

## 2025-03-20 RX ADMIN — FENTANYL CITRATE 50 MCG: 50 INJECTION, SOLUTION INTRAMUSCULAR; INTRAVENOUS at 07:54

## 2025-03-20 RX ADMIN — ROCURONIUM BROMIDE 20 MG: 10 INJECTION, SOLUTION INTRAVENOUS at 08:35

## 2025-03-20 RX ADMIN — ONDANSETRON 4 MG: 2 INJECTION INTRAMUSCULAR; INTRAVENOUS at 08:44

## 2025-03-20 RX ADMIN — CEFAZOLIN SODIUM 2 G: 2 INJECTION, SOLUTION INTRAVENOUS at 07:30

## 2025-03-20 RX ADMIN — FENTANYL CITRATE 100 MCG: 50 INJECTION, SOLUTION INTRAMUSCULAR; INTRAVENOUS at 07:32

## 2025-03-20 RX ADMIN — SUGAMMADEX 200 MG: 100 INJECTION, SOLUTION INTRAVENOUS at 09:31

## 2025-03-20 RX ADMIN — HYDROMORPHONE HYDROCHLORIDE 0.5 MG: 1 INJECTION, SOLUTION INTRAMUSCULAR; INTRAVENOUS; SUBCUTANEOUS at 10:39

## 2025-03-20 RX ADMIN — SODIUM CHLORIDE, POTASSIUM CHLORIDE, SODIUM LACTATE AND CALCIUM CHLORIDE: 600; 310; 30; 20 INJECTION, SOLUTION INTRAVENOUS at 07:23

## 2025-03-20 RX ADMIN — ONDANSETRON 4 MG: 2 INJECTION INTRAMUSCULAR; INTRAVENOUS at 12:06

## 2025-03-20 RX ADMIN — KETOROLAC TROMETHAMINE 30 MG: 30 INJECTION, SOLUTION INTRAMUSCULAR at 08:44

## 2025-03-20 RX ADMIN — APREPITANT 40 MG: 40 CAPSULE ORAL at 12:49

## 2025-03-20 RX ADMIN — MIDAZOLAM 2 MG: 1 INJECTION INTRAMUSCULAR; INTRAVENOUS at 07:26

## 2025-03-20 RX ADMIN — HYDROMORPHONE HYDROCHLORIDE 0.2 MG: 1 INJECTION, SOLUTION INTRAMUSCULAR; INTRAVENOUS; SUBCUTANEOUS at 10:08

## 2025-03-20 RX ADMIN — ROCURONIUM BROMIDE 50 MG: 10 INJECTION, SOLUTION INTRAVENOUS at 07:32

## 2025-03-20 RX ADMIN — ROCURONIUM BROMIDE 20 MG: 10 INJECTION, SOLUTION INTRAVENOUS at 07:56

## 2025-03-20 RX ADMIN — FENTANYL CITRATE 25 MCG: 50 INJECTION, SOLUTION INTRAMUSCULAR; INTRAVENOUS at 08:42

## 2025-03-20 RX ADMIN — SODIUM CHLORIDE, POTASSIUM CHLORIDE, SODIUM LACTATE AND CALCIUM CHLORIDE: 600; 310; 30; 20 INJECTION, SOLUTION INTRAVENOUS at 09:23

## 2025-03-20 SDOH — HEALTH STABILITY: MENTAL HEALTH: CURRENT SMOKER: 0

## 2025-03-20 ASSESSMENT — PAIN DESCRIPTION - DESCRIPTORS
DESCRIPTORS: SORE

## 2025-03-20 ASSESSMENT — COLUMBIA-SUICIDE SEVERITY RATING SCALE - C-SSRS
2. HAVE YOU ACTUALLY HAD ANY THOUGHTS OF KILLING YOURSELF?: NO
1. IN THE PAST MONTH, HAVE YOU WISHED YOU WERE DEAD OR WISHED YOU COULD GO TO SLEEP AND NOT WAKE UP?: NO
6. HAVE YOU EVER DONE ANYTHING, STARTED TO DO ANYTHING, OR PREPARED TO DO ANYTHING TO END YOUR LIFE?: NO

## 2025-03-20 ASSESSMENT — PAIN - FUNCTIONAL ASSESSMENT
PAIN_FUNCTIONAL_ASSESSMENT: 0-10

## 2025-03-20 ASSESSMENT — PAIN SCALES - GENERAL
PAINLEVEL_OUTOF10: 4
PAINLEVEL_OUTOF10: 0 - NO PAIN
PAINLEVEL_OUTOF10: 0 - NO PAIN
PAINLEVEL_OUTOF10: 3
PAINLEVEL_OUTOF10: 0 - NO PAIN
PAINLEVEL_OUTOF10: 5 - MODERATE PAIN
PAINLEVEL_OUTOF10: 4
PAIN_LEVEL: 0
PAINLEVEL_OUTOF10: 0 - NO PAIN
PAINLEVEL_OUTOF10: 3
PAINLEVEL_OUTOF10: 3

## 2025-03-20 NOTE — ANESTHESIA PROCEDURE NOTES
Airway  Date/Time: 3/20/2025 7:35 AM  Urgency: elective    Airway not difficult    Staffing  Performed: attending   Authorized by: Sonny Kang MD    Performed by: Sonny Kang MD  Patient location during procedure: OR    Indications and Patient Condition  Indications for airway management: anesthesia  Spontaneous Ventilation: absent  Sedation level: deep  Preoxygenated: yes  Patient position: sniffing  MILS maintained throughout  Mask difficulty assessment: 1 - vent by mask  Planned trial extubation    Final Airway Details  Final airway type: endotracheal airway      Successful airway: ETT  Cuffed: yes   Successful intubation technique: video laryngoscopy  Facilitating devices/methods: intubating stylet  Endotracheal tube insertion site: oral  Blade: Gurjit  Blade size: #4  ETT size (mm): 8.0  Cormack-Lehane Classification: grade I - full view of glottis  Placement verified by: chest auscultation and capnometry   Cuff volume (mL): 10  Measured from: teeth  ETT to teeth (cm): 22  Number of attempts at approach: 1  Ventilation between attempts: none  Number of other approaches attempted: 0

## 2025-03-20 NOTE — POST-PROCEDURE NOTE
Discharge instructions reviewed with pt and spouse. Questions answered. Paperwork provided in folder to take home.

## 2025-03-20 NOTE — OP NOTE
ROBOTIC ASSISTED LAPAROSCOPIC RIGHT  INGUINAL HERNIA REPAIR WITH MESH (R), UMBILICAL HERNIA OPEN REPAIR WITH POSSIBLE MESH Operative Note     Date: 3/20/2025  OR Location: [facility] OR    Name: Dada Reyes, : 1970, Age: 54 y.o., MRN: [id number], Sex: male    Diagnosis  Pre-op Diagnosis      * Right inguinal hernia [K40.90]     * Umbilical hernia without obstruction and without gangrene [K42.9] Post-op Diagnosis     * Right inguinal hernia [K40.90]     * Umbilical hernia without obstruction and without gangrene [K42.9]     Procedures  ROBOTIC ASSISTED LAPAROSCOPIC RIGHT  INGUINAL HERNIA REPAIR WITH MESH  07161 - AR LAPAROSCOPY SURG RPR INITIAL INGUINAL HERNIA    UMBILICAL HERNIA OPEN REPAIR WITH POSSIBLE MESH  89649 - AR RPR AA HERNIA 1ST < 3 CM REDUCIBLE      Surgeons      * Luis Manuel Kaiser - Primary    Resident/Fellow/Other Assistant:  Surgeons and Role:  * No surgeons found with a matching role *    Staff:   Circulator: Mari Arrington Person: Tamara  Surgical Assistant: Azalia Arrington Person: Dejan Shaver Circulator: Tiffanie    Anesthesia Staff: Anesthesiologist: Sonny Kang MD    Procedure Summary  Anesthesia: General  ASA: II  Estimated Blood Loss: 0mL  Intra-op Medications: * Intraprocedure medication information is unavailable because the case start and end events have not been set *           Anesthesia Record               Intraprocedure I/O Totals          Intake    ceFAZolin (Ancef) 2 g in dextrose (iso)  mL 100.00 mL    Total Intake 100 mL       Output    Urine 100 mL    NG/OG Tube Output 40 mL    Total Output 140 mL       Net    Net Volume -40 mL          Specimen:   ID Type Source Tests Collected by Time   1 : UMBILICAL HERNIA SAC Tissue HERNIA SAC SURGICAL PATHOLOGY EXAM Luis Manuel Kaiser MD 3/20/2025 0854                 Drains and/or Catheters:   Urethral Catheter Non-latex 16 Fr. (Active)       Tourniquet Times:         Implants:  Implants       Type Name Action Serial No.       Surgical Mesh Sling Implant MESH, PROGRIP LAP, 10 X 15 CM, [facility] - [id number] Implanted               Findings: Right direct inguinal hernia.  Umbilical hernia containing omentum.    Indications: Dada Reyes is an 54 y.o. male who is having surgery for Right inguinal hernia [K40.90]  Umbilical hernia without obstruction and without gangrene [K42.9].     The patient was seen in the preoperative area. The risks, benefits, complications, treatment options, non-operative alternatives, expected recovery and outcomes were discussed with the patient. The possibilities of reaction to medication, pulmonary aspiration, injury to surrounding structures, bleeding, recurrent infection, the need for additional procedures, failure to diagnose a condition, and creating a complication requiring transfusion or operation were discussed with the patient. The patient concurred with the proposed plan, giving informed consent.  The site of surgery was properly noted/marked if necessary per policy. The patient has been actively warmed in preoperative area. Preoperative antibiotics have been ordered and given within 1 hours of incision. Venous thrombosis prophylaxis have been ordered including bilateral sequential compression devices    Procedure Details: Following informed consent the patient was taken to the operating room and placed in supine position.  A huddle was performed.  The patient was given general anesthetic.  Sequential compression devices were in place and functioning.  The patient received Ancef IV in the operating room.  The abdomen was prepped and draped in sterile fashion.  A timeout was performed.  A left upper quadrant skin incision was made at Benavidez's point and a 5 mm Visiport inserted under direct vision.  The abdomen was insufflated with carbon dioxide to a pressure of 15 mmHg.  An 8 mm port was placed into the right upper quadrant under direct vision and another 8 mm port placed into the epigastric area just  off midline under direct vision.  The left upper quadrant 5 mm port was exchanged for an 8 mm port.  The patient was placed into Trendelenburg position.  The robot was docked.  The abdomen and pelvis was examined.  The patient had omental adhesions within the umbilical hernia.  The omentum was reduced.  The patient was found to have a [right direct] inguinal hernia.  A hockey-stick shaped peritoneal incision was made in the [right] pelvis.  The region of the myopectineal orifice was dissected.  Dissection continued 2 cm across midline and 2 cm posterior to Aleksandar's ligament.  Lateral dissection was performed.  The spermatic vessels and vas deferens were parietalized.  [The patient had a wide based direct hernia which was]reduced.  Hemostasis appeared to be excellent.  A 10 x 15 cm diameter ProGrip mesh was inserted and secured.  The mesh was secured to the anterior and medial edge of the direct hernia sac with interrupted 3-0 Vicryl sutures.  This mesh provided wide overlap of the myopectineal orifice.  Insufflation pressure was decreased to 10 mmHg.  The peritoneum was reapproximated with a running absorbable barbed suture.  The abdomen was deflated and the ports were removed.  A midline umbilical skin incision was made overlying the umbilical hernia.  The hernia sac was freed from surrounding tissue and excised.  The fascial defect was approximately 1.5 cm in diameter.  The fascia was freed of subcutaneous fat circumferentially around the defect.  The hernia was closed in a transverse direction with interrupted 0 Prolene sutures.  There was no significant tension on the closure and mesh was not placed.  Half percent plain Marcaine was infiltrated into each of the fascial incisions.  The umbilical wound was closed in layers of interrupted 3-0 Vicryl subcutaneous sutures and a running 4-0 Vicryl subcuticular skin stitch.  The laparoscopic skin incisions were closed with running 4-0 Vicryl subcuticular sutures.   Dressings were placed and the patient was taken to the recovery room in satisfactory condition having tolerated the procedure well.  Counts were correct.  Complications:  None; patient tolerated the procedure well.    Disposition: PACU - hemodynamically stable.  Condition: stable         Task Performed by RNFA or Surgical Assistant:  Camera direction, retraction, skin closure        Luis Manuel Kaiser  Phone Number: [phone]

## 2025-03-20 NOTE — ANESTHESIA POSTPROCEDURE EVALUATION
Patient: Dada Reyes    Procedure Summary       Date: 03/20/25 Room / Location: [facility] OR 09 / [facility]    Anesthesia Start: 0723 Anesthesia Stop: 0941    Procedures:       ROBOTIC ASSISTED LAPAROSCOPIC RIGHT  INGUINAL HERNIA REPAIR WITH MESH (Right: Abdomen)      UMBILICAL HERNIA OPEN REPAIR (Abdomen) Diagnosis:       Right inguinal hernia      Umbilical hernia without obstruction and without gangrene      (Right inguinal hernia [K40.90])      (Umbilical hernia without obstruction and without gangrene [K42.9])    Surgeons: Luis Manuel Kaiser MD Responsible Provider: Sonny Kang MD    Anesthesia Type: general ASA Status: 2            Anesthesia Type: general    Vitals Value Taken Time   /84 03/20/25 0940   Temp 36.3 03/20/25 0941   Pulse 86 03/20/25 0940   Resp 16 03/20/25 0941   SpO2 99 % 03/20/25 0940   Vitals shown include unfiled device data.    Anesthesia Post Evaluation    Patient location during evaluation: PACU  Patient participation: complete - patient participated  Level of consciousness: awake and alert  Pain score: 0  Pain management: adequate  Airway patency: patent  Cardiovascular status: acceptable and hemodynamically stable  Respiratory status: acceptable, spontaneous ventilation and nasal cannula  Hydration status: acceptable  Postoperative Nausea and Vomiting: none        There were no known notable events for this encounter.

## 2025-03-31 LAB
LABORATORY COMMENT REPORT: NORMAL
PATH REPORT.FINAL DX SPEC: NORMAL
PATH REPORT.GROSS SPEC: NORMAL
PATH REPORT.RELEVANT HX SPEC: NORMAL
PATH REPORT.TOTAL CANCER: NORMAL

## 2025-04-01 PROBLEM — Z09 POSTOP CHECK: Status: ACTIVE | Noted: 2025-04-01

## 2025-04-01 NOTE — PROGRESS NOTES
"History Of Present Illness  Dada Reyes is a 54 y.o. male status post robotic assisted laparoscopic right inguinal herniorrhaphy with mesh and open repair of umbilical hernia without mesh on March 20, 2025.  The patient was found to have a right direct inguinal hernia.  The umbilical hernia contained omentum.  The patient complains of some intermittent right lower quadrant pressure-like discomfort following which he has a bowel movement.  The symptoms have been actually occurring for 1 year.  He has a good appetite without nausea or vomiting.  He has alternating constipation and diarrhea.  He had a colonoscopy just over 4 years ago.  No other abdominal pain.  He returned to normal daily activity about 7 days postop.  He took 8 oxycodone tablets postop.     Last Recorded Vitals  Blood pressure 148/88, pulse 70, temperature 36.2 °C (97.1 °F), temperature source Temporal, resp. rate 16, height 1.778 m (5' 10\"), weight 101 kg (222 lb 3.2 oz), SpO2 97%.    Physical Exam  General: Well-developed, well-nourished, no acute distress, alert and oriented  Wounds: Intact, no erythema or signs of infection  Abdomen: Nondistended, soft and nontender.  No incisional or inguinal hernias palpable.    Relevant Results  Surgical Pathology Exam: [id number]  Order: [id number]   Collected 3/20/2025 08:54       Status: Final result       Visible to patient: Yes (seen)       Dx: Right inguinal hernia; Umbilical russell...    0 Result Notes       Component  Resulting Agency   FINAL DIAGNOSIS      SOFT TISSUE OF UMBILICAL REGION, EXCISION:              Mesothelial-lined fibroadipose tissue with vascular congestion and recent hemorrhage, clinical umbilical hernia sac.   [facility]           Assessment/Plan   Diagnoses and all orders for this visit:  Postop check  Recovering well.  Uncertain etiology for intermittent right lower quadrant discomfort which resolves after bowel movement.  I told the patient that he should follow-up with his " gastroenterologist.  Follow-up with me in 1 month.      Luis Manuel Kaiser MD

## 2025-04-02 ENCOUNTER — APPOINTMENT (OUTPATIENT)
Dept: SURGERY | Facility: CLINIC | Age: 55
End: 2025-04-02
Payer: COMMERCIAL

## 2025-04-02 VITALS
DIASTOLIC BLOOD PRESSURE: 88 MMHG | HEART RATE: 70 BPM | SYSTOLIC BLOOD PRESSURE: 148 MMHG | OXYGEN SATURATION: 97 % | WEIGHT: 222.2 LBS | TEMPERATURE: 97.1 F | BODY MASS INDEX: 31.81 KG/M2 | RESPIRATION RATE: 16 BRPM | HEIGHT: 70 IN

## 2025-04-02 DIAGNOSIS — Z09 POSTOP CHECK: Primary | ICD-10-CM

## 2025-04-02 PROCEDURE — 3008F BODY MASS INDEX DOCD: CPT | Performed by: SURGERY

## 2025-04-02 PROCEDURE — 1036F TOBACCO NON-USER: CPT | Performed by: SURGERY

## 2025-04-02 PROCEDURE — 99024 POSTOP FOLLOW-UP VISIT: CPT | Performed by: SURGERY

## 2025-04-02 ASSESSMENT — PAIN SCALES - GENERAL: PAINLEVEL_OUTOF10: 1

## 2025-04-02 NOTE — LETTER
"April 2, 2025     Ravinder Pena MD  [address]  [address]  [facility] [address]    Patient: Dada Reyes   YOB: 1970   Date of Visit: 4/2/2025       Dear Dr. Ravinder Pena MD:    Thank you for referring Dada Reyes to me for evaluation. Below are my notes for this consultation.  If you have questions, please do not hesitate to call me. I look forward to following your patient along with you.       Sincerely,     Luis Manuel Kaiser MD      CC: No Recipients  ______________________________________________________________________________________    History Of Present Illness  Dada Reyes is a 54 y.o. male status post robotic assisted laparoscopic right inguinal herniorrhaphy with mesh and open repair of umbilical hernia without mesh on March 20, 2025.  The patient was found to have a right direct inguinal hernia.  The umbilical hernia contained omentum.  The patient complains of some intermittent right lower quadrant pressure-like discomfort following which he has a bowel movement.  The symptoms have been actually occurring for 1 year.  He has a good appetite without nausea or vomiting.  He has alternating constipation and diarrhea.  He had a colonoscopy just over 4 years ago.  No other abdominal pain.  He returned to normal daily activity about 7 days postop.  He took 8 oxycodone tablets postop.     Last Recorded Vitals  Blood pressure 148/88, pulse 70, temperature 36.2 °C (97.1 °F), temperature source Temporal, resp. rate 16, height 1.778 m (5' 10\"), weight 101 kg (222 lb 3.2 oz), SpO2 97%.    Physical Exam  General: Well-developed, well-nourished, no acute distress, alert and oriented  Wounds: Intact, no erythema or signs of infection  Abdomen: Nondistended, soft and nontender.  No incisional or inguinal hernias palpable.    Relevant Results  Surgical Pathology Exam: [id number]  Order: [id number]   Collected 3/20/2025 08:54       Status: Final result       Visible to patient: Yes (seen)       Dx: " Right inguinal hernia; Umbilical russell...    0 Result Notes       Component  Resulting Agency   FINAL DIAGNOSIS      SOFT TISSUE OF UMBILICAL REGION, EXCISION:              Mesothelial-lined fibroadipose tissue with vascular congestion and recent hemorrhage, clinical umbilical hernia sac.   [facility]           Assessment/Plan  Diagnoses and all orders for this visit:  Postop check  Recovering well.  Uncertain etiology for intermittent right lower quadrant discomfort which resolves after bowel movement.  I told the patient that he should follow-up with his gastroenterologist.  Follow-up with me in 1 month.      Luis Manuel Kaiser MD

## 2025-04-30 NOTE — PROGRESS NOTES
"History Of Present Illness  April 2, 2025  Dada Reyes is a 54 y.o. male status post robotic assisted laparoscopic right inguinal herniorrhaphy with mesh and open repair of umbilical hernia without mesh on March 20, 2025.  The patient was found to have a right direct inguinal hernia.  The umbilical hernia contained omentum.  The patient complains of some intermittent right lower quadrant pressure-like discomfort following which he has a bowel movement.  The symptoms have been actually occurring for 1 year.  He has a good appetite without nausea or vomiting.  He has alternating constipation and diarrhea.  He had a colonoscopy just over 4 years ago.  No other abdominal pain.  He returned to normal daily activity about 7 days postop.  He took 8 oxycodone tablets postop.    May 1, 2025  No complaints regarding the hernia repair.  No incisional pain.  He continues to have intermittent right lower quadrant pressure-like discomfort following which he has a bowel movement.  He had the same discomfort preoperatively which was evaluated with CT pelvis.  He reports that he is going to see his gastroenterologist and schedule a colonoscopy.    Last Recorded Vitals  Blood pressure 126/81, pulse 73, temperature 36.6 °C (97.8 °F), temperature source Temporal, resp. rate 17, height 1.803 m (5' 11\"), weight 102 kg (224 lb), SpO2 95%.    Physical Exam  General: Well-developed, well-nourished, no acute distress, alert and oriented  Wound: Intact, no erythema or signs of infection  Abdomen: Nondistended, soft and nontender    Assessment/Plan   Diagnoses and all orders for this visit:  Postop check    Recovering well.  Uncertain etiology for intermittent right lower quadrant discomfort which resolves after bowel movement.  This was also present preop.  It is unrelated to the hernia repair.  He is going to see his gastroenterologist and schedule a colonoscopy.  Follow-up with me as needed.    Luis Manuel Kaiser MD  "

## 2025-05-02 ENCOUNTER — APPOINTMENT (OUTPATIENT)
Dept: SURGERY | Facility: CLINIC | Age: 55
End: 2025-05-02
Payer: COMMERCIAL

## 2025-05-02 VITALS
RESPIRATION RATE: 17 BRPM | TEMPERATURE: 97.8 F | WEIGHT: 224 LBS | HEIGHT: 71 IN | BODY MASS INDEX: 31.36 KG/M2 | DIASTOLIC BLOOD PRESSURE: 81 MMHG | HEART RATE: 73 BPM | SYSTOLIC BLOOD PRESSURE: 126 MMHG | OXYGEN SATURATION: 95 %

## 2025-05-02 DIAGNOSIS — Z09 POSTOP CHECK: Primary | ICD-10-CM

## 2025-05-02 PROCEDURE — 3008F BODY MASS INDEX DOCD: CPT | Performed by: SURGERY

## 2025-05-02 PROCEDURE — 1036F TOBACCO NON-USER: CPT | Performed by: SURGERY

## 2025-05-02 PROCEDURE — 99024 POSTOP FOLLOW-UP VISIT: CPT | Performed by: SURGERY

## 2025-05-02 ASSESSMENT — PAIN SCALES - GENERAL: PAINLEVEL_OUTOF10: 0-NO PAIN

## 2025-05-02 NOTE — LETTER
"May 2, 2025     Ravinder Pena MD  [address]  [address]  [facility] [address]    Patient: Dada Reyes   YOB: 1970   Date of Visit: 5/2/2025       Dear Dr. Ravinder Pena MD:    Thank you for referring Dada Reyes to me for evaluation. Below are my notes for this consultation.  If you have questions, please do not hesitate to call me. I look forward to following your patient along with you.       Sincerely,     Luis Manuel Kaiser MD      CC: No Recipients  ______________________________________________________________________________________    History Of Present Illness  April 2, 2025  Dada Reyes is a 54 y.o. male status post robotic assisted laparoscopic right inguinal herniorrhaphy with mesh and open repair of umbilical hernia without mesh on March 20, 2025.  The patient was found to have a right direct inguinal hernia.  The umbilical hernia contained omentum.  The patient complains of some intermittent right lower quadrant pressure-like discomfort following which he has a bowel movement.  The symptoms have been actually occurring for 1 year.  He has a good appetite without nausea or vomiting.  He has alternating constipation and diarrhea.  He had a colonoscopy just over 4 years ago.  No other abdominal pain.  He returned to normal daily activity about 7 days postop.  He took 8 oxycodone tablets postop.    May 1, 2025  No complaints regarding the hernia repair.  No incisional pain.  He continues to have intermittent right lower quadrant pressure-like discomfort following which he has a bowel movement.  He had the same discomfort preoperatively which was evaluated with CT pelvis.  He reports that he is going to see his gastroenterologist and schedule a colonoscopy.    Last Recorded Vitals  Blood pressure 126/81, pulse 73, temperature 36.6 °C (97.8 °F), temperature source Temporal, resp. rate 17, height 1.803 m (5' 11\"), weight 102 kg (224 lb), SpO2 95%.    Physical Exam  General: " Well-developed, well-nourished, no acute distress, alert and oriented  Wound: Intact, no erythema or signs of infection  Abdomen: Nondistended, soft and nontender    Assessment/Plan  Diagnoses and all orders for this visit:  Postop check    Recovering well.  Uncertain etiology for intermittent right lower quadrant discomfort which resolves after bowel movement.  This was also present preop.  It is unrelated to the hernia repair.  He is going to see his gastroenterologist and schedule a colonoscopy.  Follow-up with me as needed.    Luis Manuel Kaiser MD

## (undated) DEVICE — BANDAGE, GAUZE, CONFORMING, KERLIX, 6 PLY, 4.5 IN X 4.1 YD

## (undated) DEVICE — DRESSING, TRANSPARENT, TEGADERM, 2-3/8 X 2-3/4 IN

## (undated) DEVICE — CAUTERY, PENCIL, PUSH BUTTON, SMOKE EVAC, 70MM

## (undated) DEVICE — TUBING SET, BIFURCATED, SMOKE EVAC, FILTERED, F/AIRSEAL

## (undated) DEVICE — Device

## (undated) DEVICE — CATHETER TRAY, SURESTEP, 16FR, URINE METER W/STATLOCK

## (undated) DEVICE — SYRINGE, 10 CC, SLIP TIP

## (undated) DEVICE — BANDAGE, COFLEX, 6 X 5 YDS, FOAM TAN, STERILE, LF

## (undated) DEVICE — TROCAR, KII OPTICAL BLADELESS 5MM Z THREAD 100MM LNGTH

## (undated) DEVICE — DRAPE, ARM XI

## (undated) DEVICE — COVER, TIP HOT SHEARS ENDOWRIST

## (undated) DEVICE — DRAPE, SHEET, THREE QUARTER, FAN FOLD, 57 X 77 IN

## (undated) DEVICE — SYRINGE, 20 CC, LUER SLIP

## (undated) DEVICE — FORCEPS, PROGRASP, DAVINCI XI

## (undated) DEVICE — DRAPE, COLUMN, DAVINCI XI

## (undated) DEVICE — CARE KIT, LAPAROSCOPIC, ADVANCED

## (undated) DEVICE — SLEEVE, VASO PRESS, CALF GARMENT, MEDIUM, GREEN

## (undated) DEVICE — GOWN, ASTOUND, XL

## (undated) DEVICE — LUBRICANT, ELECTROLUBE, F/ELECTRODE TIPS

## (undated) DEVICE — DRESSING, TELFA, 3X4

## (undated) DEVICE — DRIVER, NEEDLE, MEGA SUTURE CUT, DAVINCI XI

## (undated) DEVICE — SCISSOR TIP, ENDOCUT, LAPAROSCOPIC

## (undated) DEVICE — GRASPER TIP, MODIFIED TRADITIONAL, 22.6MM

## (undated) DEVICE — SCISSORS, MONOPOLAR, CURVED, 8MM

## (undated) DEVICE — STRIP, SKIN CLOSURE, STERI STRIP, REINFORCED, 0.5 X 4 IN

## (undated) DEVICE — SEAL, UNIVERSAL, 5-12MM